# Patient Record
Sex: MALE | Race: WHITE | NOT HISPANIC OR LATINO | Employment: OTHER | URBAN - METROPOLITAN AREA
[De-identification: names, ages, dates, MRNs, and addresses within clinical notes are randomized per-mention and may not be internally consistent; named-entity substitution may affect disease eponyms.]

---

## 2018-08-29 ENCOUNTER — TELEPHONE (OUTPATIENT)
Dept: UROLOGY | Facility: MEDICAL CENTER | Age: 83
End: 2018-08-29

## 2018-08-29 NOTE — TELEPHONE ENCOUNTER
Complaint/Diagnosis: Testicular pain    Insurance: [facility]    History of cancer: N/A    Previous Urologist: N/A    Outside testing/ Where: N/A    If yes, What kind: N/A    Records Requested/Where: N/A

## 2018-09-10 RX ORDER — CITALOPRAM 20 MG/1
20 TABLET ORAL
COMMUNITY
Start: 2018-05-21

## 2018-09-10 RX ORDER — TAMSULOSIN HYDROCHLORIDE 0.4 MG/1
0.4 CAPSULE ORAL
COMMUNITY
Start: 2017-07-21

## 2018-09-10 RX ORDER — NITROGLYCERIN 0.4 MG/1
0.4 TABLET SUBLINGUAL
COMMUNITY
Start: 2017-03-23

## 2018-09-10 RX ORDER — NICOTINE POLACRILEX 4 MG/1
GUM, CHEWING ORAL
COMMUNITY
Start: 2018-07-16

## 2018-09-10 RX ORDER — FINASTERIDE 5 MG/1
5 TABLET, FILM COATED ORAL
COMMUNITY
Start: 2018-09-10

## 2018-09-10 RX ORDER — SIMVASTATIN 40 MG
40 TABLET ORAL
COMMUNITY
Start: 2018-05-21

## 2018-09-10 RX ORDER — TRIAMCINOLONE ACETONIDE 0.25 MG/G
1 CREAM TOPICAL 2 TIMES DAILY
COMMUNITY

## 2018-09-10 RX ORDER — METOPROLOL SUCCINATE 25 MG/1
12.5 TABLET, EXTENDED RELEASE ORAL
COMMUNITY
Start: 2018-05-21 | End: 2019-05-21

## 2018-09-10 RX ORDER — LEVOTHYROXINE SODIUM 0.12 MG/1
125 TABLET ORAL
COMMUNITY
Start: 2018-09-10

## 2018-09-13 ENCOUNTER — OFFICE VISIT (OUTPATIENT)
Dept: UROLOGY | Facility: MEDICAL CENTER | Age: 83
End: 2018-09-13
Payer: MEDICARE

## 2018-09-13 VITALS
HEIGHT: 72 IN | SYSTOLIC BLOOD PRESSURE: 124 MMHG | BODY MASS INDEX: 25.6 KG/M2 | DIASTOLIC BLOOD PRESSURE: 72 MMHG | WEIGHT: 189 LBS

## 2018-09-13 DIAGNOSIS — R39.89 SENSATION OF PRESSURE IN BLADDER AREA: Primary | ICD-10-CM

## 2018-09-13 LAB
SL AMB  POCT GLUCOSE, UA: ABNORMAL
SL AMB LEUKOCYTE ESTERASE,UA: ABNORMAL
SL AMB POCT BILIRUBIN,UA: ABNORMAL
SL AMB POCT BLOOD,UA: ABNORMAL
SL AMB POCT CLARITY,UA: CLEAR
SL AMB POCT COLOR,UA: YELLOW
SL AMB POCT KETONES,UA: ABNORMAL
SL AMB POCT NITRITE,UA: ABNORMAL
SL AMB POCT PH,UA: 6
SL AMB POCT SPECIFIC GRAVITY,UA: 1.02
SL AMB POCT URINE PROTEIN: ABNORMAL
SL AMB POCT UROBILINOGEN: 0.2

## 2018-09-13 PROCEDURE — 99204 OFFICE O/P NEW MOD 45 MIN: CPT | Performed by: UROLOGY

## 2018-09-13 PROCEDURE — 81003 URINALYSIS AUTO W/O SCOPE: CPT | Performed by: UROLOGY

## 2018-09-13 RX ORDER — MOXIFLOXACIN 5 MG/ML
SOLUTION/ DROPS OPHTHALMIC
COMMUNITY
Start: 2018-09-12

## 2018-09-13 RX ORDER — MULTIVIT WITH MINERALS/LUTEIN
250 TABLET ORAL DAILY
COMMUNITY

## 2018-09-13 RX ORDER — FERROUS SULFATE 325(65) MG
325 TABLET ORAL
COMMUNITY

## 2018-09-13 NOTE — PROGRESS NOTES
Assessment/Plan:  1  No clear etiology for the suprapubic burning sensation  Urine is clear, no suspicion of infection  Bladder empties well, PVR only 20 mL  Urine culture for reassurance will be done  2   He will let me know if symptoms get worse, and follow-up in three months  No problem-specific Assessment & Plan notes found for this encounter  Diagnoses and all orders for this visit:    Sensation of pressure in bladder area  -     POCT urine dip auto non-scope    Other orders  -     apixaban (ELIQUIS) 5 mg; Take 5 mg by mouth  -     ASPIRIN 81 PO; Take 81 mg by mouth  -     cholecalciferol (VITAMIN D3) 1,000 units tablet; Take 1 tablet by mouth  -     citalopram (CeleXA) 20 mg tablet; Take 20 mg by mouth  -     finasteride (PROSCAR) 5 mg tablet; Take 5 mg by mouth  -     levothyroxine 125 mcg tablet; Take 125 mcg by mouth  -     metoprolol succinate (TOPROL-XL) 25 mg 24 hr tablet; Take 12 5 mg by mouth  -     nitroglycerin (NITROSTAT) 0 4 mg SL tablet; Place 0 4 mg under the tongue  -     OMEGA-3 FATTY ACIDS-VITAMIN E PO; Take 1,000 mg by mouth  -     Omeprazole 20 MG TBEC; TAKE ONE CAPSULE BY MOUTH ONCE DAILY  -     simvastatin (ZOCOR) 40 mg tablet; Take 40 mg by mouth  -     tamsulosin (FLOMAX) 0 4 mg; Take 0 4 mg by mouth  -     triamcinolone (KENALOG) 0 025 % cream; Apply 1 application topically 2 (two) times a day  -     moxifloxacin (VIGAMOX) 0 5 % ophthalmic solution;   -     Cyanocobalamin (VITAMIN B-12 PO); Take by mouth  -     ferrous sulfate 325 (65 Fe) mg tablet; Take 325 mg by mouth daily with breakfast  -     ascorbic acid (VITAMIN C) 250 mg tablet; Take 250 mg by mouth daily          Subjective:      Patient ID: Inga Coy is a 80 y o  male  3-4 weeks of burning sensation in the suprapubic area  Comes and goes, not severe, nothing seems to make it better or worse  Specifically, not related in any way to his urination in terms of when he gets the pain      Decent stream and control, nocturia times 1-3  No recent hematuria infections stones incontinence  The following portions of the patient's history were reviewed and updated as appropriate: allergies, current medications, past family history, past medical history, past social history, past surgical history and problem list     Review of Systems   All other systems reviewed and are negative  Objective:      /72   Ht 6' (1 829 m)   Wt 85 7 kg (189 lb)   BMI 25 63 kg/m²          Physical Exam   Constitutional: He is oriented to person, place, and time  He appears well-developed and well-nourished  No distress  HENT:   Head: Normocephalic and atraumatic  Eyes: Conjunctivae are normal    Cardiovascular: Normal rate and regular rhythm  Pulmonary/Chest: Effort normal and breath sounds normal  No respiratory distress  He has no wheezes  Abdominal: Soft  Bowel sounds are normal  He exhibits no distension and no mass  There is no tenderness  Nontender suprapubic, no distention  No inguinal hernia  Nothing found on exam   Genitourinary:   Genitourinary Comments: 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

## 2018-09-13 NOTE — LETTER
September 13, 2018     Ciera Rodrigues MD  [facility]  [address] [place] [address]    Patient: Yoni Zhang   YOB: 1929   Date of Visit: 9/13/2018       Dear Dr Soha Kenyon: Thank you for referring Cricket Berg to me for evaluation  Below are my notes for this consultation  If you have questions, please do not hesitate to call me  I look forward to following your patient along with you  Sincerely,        Sara Lazcano MD        CC: No Recipients  Sara Lazcano MD  9/13/2018 10:58 AM  Sign at close encounter  Assessment/Plan:  1  No clear etiology for the suprapubic burning sensation  Urine is clear, no suspicion of infection  Bladder empties well, PVR only 20 mL  Urine culture for reassurance will be done  2   He will let me know if symptoms get worse, and follow-up in three months  No problem-specific Assessment & Plan notes found for this encounter  Diagnoses and all orders for this visit:    Sensation of pressure in bladder area  -     POCT urine dip auto non-scope    Other orders  -     apixaban (ELIQUIS) 5 mg; Take 5 mg by mouth  -     ASPIRIN 81 PO; Take 81 mg by mouth  -     cholecalciferol (VITAMIN D3) 1,000 units tablet; Take 1 tablet by mouth  -     citalopram (CeleXA) 20 mg tablet; Take 20 mg by mouth  -     finasteride (PROSCAR) 5 mg tablet; Take 5 mg by mouth  -     levothyroxine 125 mcg tablet; Take 125 mcg by mouth  -     metoprolol succinate (TOPROL-XL) 25 mg 24 hr tablet; Take 12 5 mg by mouth  -     nitroglycerin (NITROSTAT) 0 4 mg SL tablet; Place 0 4 mg under the tongue  -     OMEGA-3 FATTY ACIDS-VITAMIN E PO; Take 1,000 mg by mouth  -     Omeprazole 20 MG TBEC; TAKE ONE CAPSULE BY MOUTH ONCE DAILY  -     simvastatin (ZOCOR) 40 mg tablet; Take 40 mg by mouth  -     tamsulosin (FLOMAX) 0 4 mg;  Take 0 4 mg by mouth  -     triamcinolone (KENALOG) 0 025 % cream; Apply 1 application topically 2 (two) times a day  -     moxifloxacin (VIGAMOX) 0 5 % ophthalmic solution;   -     Cyanocobalamin (VITAMIN B-12 PO); Take by mouth  -     ferrous sulfate 325 (65 Fe) mg tablet; Take 325 mg by mouth daily with breakfast  -     ascorbic acid (VITAMIN C) 250 mg tablet; Take 250 mg by mouth daily          Subjective:      Patient ID: Ike Saavedra is a 80 y o  male  3-4 weeks of burning sensation in the suprapubic area  Comes and goes, not severe, nothing seems to make it better or worse  Specifically, not related in any way to his urination in terms of when he gets the pain  Decent stream and control, nocturia times 1-3  No recent hematuria infections stones incontinence  The following portions of the patient's history were reviewed and updated as appropriate: allergies, current medications, past family history, past medical history, past social history, past surgical history and problem list     Review of Systems   All other systems reviewed and are negative  Objective:      /72   Ht 6' (1 829 m)   Wt 85 7 kg (189 lb)   BMI 25 63 kg/m²           Physical Exam   Constitutional: He is oriented to person, place, and time  He appears well-developed and well-nourished  No distress  HENT:   Head: Normocephalic and atraumatic  Eyes: Conjunctivae are normal    Cardiovascular: Normal rate and regular rhythm  Pulmonary/Chest: Effort normal and breath sounds normal  No respiratory distress  He has no wheezes  Abdominal: Soft  Bowel sounds are normal  He exhibits no distension and no mass  There is no tenderness  Nontender suprapubic, no distention  No inguinal hernia  Nothing found on exam   Genitourinary:   Genitourinary Comments: 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